# Patient Record
Sex: FEMALE | Race: WHITE | NOT HISPANIC OR LATINO
[De-identification: names, ages, dates, MRNs, and addresses within clinical notes are randomized per-mention and may not be internally consistent; named-entity substitution may affect disease eponyms.]

---

## 2020-01-02 ENCOUNTER — TRANSCRIPTION ENCOUNTER (OUTPATIENT)
Age: 35
End: 2020-01-02

## 2020-10-02 ENCOUNTER — TRANSCRIPTION ENCOUNTER (OUTPATIENT)
Age: 35
End: 2020-10-02

## 2020-10-08 ENCOUNTER — TRANSCRIPTION ENCOUNTER (OUTPATIENT)
Age: 35
End: 2020-10-08

## 2021-01-27 ENCOUNTER — TRANSCRIPTION ENCOUNTER (OUTPATIENT)
Age: 36
End: 2021-01-27

## 2023-02-13 PROBLEM — Z00.00 ENCOUNTER FOR PREVENTIVE HEALTH EXAMINATION: Status: ACTIVE | Noted: 2023-02-13

## 2023-02-14 ENCOUNTER — APPOINTMENT (OUTPATIENT)
Dept: GASTROENTEROLOGY | Facility: CLINIC | Age: 38
End: 2023-02-14
Payer: COMMERCIAL

## 2023-02-14 DIAGNOSIS — R10.9 UNSPECIFIED ABDOMINAL PAIN: ICD-10-CM

## 2023-02-14 PROCEDURE — 99214 OFFICE O/P EST MOD 30 MIN: CPT | Mod: 95

## 2023-02-14 NOTE — REASON FOR VISIT
[Initial Evaluation] : an initial evaluation [Home] : at home, [unfilled] , at the time of the visit. [Medical Office: ([facility])___] : at the medical office located in  [Patient] : the patient [Self] : self

## 2023-02-14 NOTE — HISTORY OF PRESENT ILLNESS
[FreeTextEntry1] : 37F here for cesar richards\par in high school eats something in AM and DIARRHEA (anxiety?)\par post college CONSTIPATED for days ... then urgency\par didn’t get her period for 8 years MARATHONS AND DISORDERED EATING/orthorexia- saw naturopath and helped her get her period back\par 2017 moved to [facility]\par BLOATED CONSTIPATED DISTENDED unbearable\par 2019 started working with parsley BLOATED AND CONSTIPATED\par 2020 SIBO test --> antibiotics rifxamin/neomycin + herbals (berberine...) and low fodmap 4wks and atrantil and motilpro..  felt 100% /much better that cleared up her skin\par 2022 BLOATED and constipated again so took rifaximin/neomycin felt good but not as great\par having RIGHT SIDE ACNE on spironolactone and topicals \par saw dr minaya did EGD CSCOPE normal -- just a little of ACID  and \par \par \par

## 2023-02-14 NOTE — ASSESSMENT
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

## 2024-04-20 ENCOUNTER — EMERGENCY (EMERGENCY)
Facility: HOSPITAL | Age: 39
LOS: 1 days | Discharge: HOME | End: 2024-04-20
Attending: EMERGENCY MEDICINE | Admitting: EMERGENCY MEDICINE
Payer: COMMERCIAL

## 2024-04-20 VITALS
RESPIRATION RATE: 16 BRPM | HEART RATE: 72 BPM | WEIGHT: 111.99 LBS | HEIGHT: 61 IN | SYSTOLIC BLOOD PRESSURE: 108 MMHG | DIASTOLIC BLOOD PRESSURE: 70 MMHG | TEMPERATURE: 98 F | OXYGEN SATURATION: 100 %

## 2024-04-20 PROCEDURE — 99284 EMERGENCY DEPT VISIT MOD MDM: CPT

## 2024-04-20 PROCEDURE — 70450 CT HEAD/BRAIN W/O DYE: CPT | Mod: 26,MC

## 2024-04-20 RX ORDER — ACETAMINOPHEN 500 MG
975 TABLET ORAL ONCE
Refills: 0 | Status: DISCONTINUED | OUTPATIENT
Start: 2024-04-20 | End: 2024-04-20

## 2024-04-20 NOTE — ED PROVIDER NOTE - PATIENT PORTAL LINK FT
You can access the FollowMyHealth Patient Portal offered by [facility] by registering at the following website: [email or web address]. By joining [facility]’s FollowMyHealth portal, you will also be able to view your health information using other applications (apps) compatible with our system.

## 2024-04-20 NOTE — ED PROVIDER NOTE - CARE PROVIDER_API CALL
Bradford Ball [facility]  Otolaryngology  [address], [address] [place] [address]  Phone: [phone]  Fax: [phone]  Follow Up Time:     Jason Tavera  Neurology  [address] [place] [address], [address]  [place] [address]  Phone: [phone]  Fax: [phone]  Follow Up Time:

## 2024-04-20 NOTE — ED PROVIDER NOTE - OBJECTIVE STATEMENT
Pt is a 38 yr old female c/o developing some neck stiffness on Wed or Thurs and developed an occipital HA since yesterday.  Over the last few wks also with some nausea after eats.  She gets HAs a lot -- usually around her menstrual cycle.  She says she is in the window now for her menstrual cycle.  No fever.  No head trauma.  Excedrin Migraine medication helped this morning.  Her HA pain is currently a 3 or 4 on a 1 to 10 scale.    PMH: Chronic headaches  Meds: Excedrin  Allergies: NKDA  Social: No tobacco, occasional ETOH, no drugs  PCP:  [facility]

## 2024-04-20 NOTE — ED PROVIDER NOTE - NSFOLLOWUPINSTRUCTIONS_ED_ALL_ED_FT
Thank you for letting us take care of you today.  Return to the Emergency Department if your symptoms worsen, or if any problems.    Take Tylenol and Motrin for your headache/neck pain.    Please follow up with the ENT (Ear-Nose-Throat) doctor (Dr. Bradford Ball).  Call [phone] to make an appointment.  If you have trouble making an ENT appointment please call our Referrals Manager (Livier) at [phone].  It is important that you do this because your CAT Scan results need further investigation.    For the headaches, please also call Dr. Tavera's office at the phone number listed below.    General Headache  A headache is pain or discomfort felt around the head or neck area. There are many causes and types of headaches. A few common types include:  Tension headaches.  Migraine headaches.  Cluster headaches.  Chronic daily headaches.  Sometimes, the specific cause of a headache may not be found.    Follow these instructions at home:  Watch your condition for any changes. Let your health care provider know about them. Take these steps to help with your condition:    Managing pain    A bag of ice on a towel on the skin.   A heating pad for use on the painful area.   Take over-the-counter and prescription medicines only as told by your health care provider. Treatment may include medicines for pain that are taken by mouth or applied to the skin.  Lie down in a dark, quiet room when you have a headache.  Keep lights dim if bright lights bother you or make your headaches worse.  If directed, put ice on your head and neck area:  Put ice in a plastic bag.  Place a towel between your skin and the bag.  Leave the ice on for 20 minutes, 2–3 times per day.  Remove the ice if your skin turns bright red. This is very important. If you cannot feel pain, heat, or cold, you have a greater risk of damage to the area.  If directed, apply heat to the affected area. Use the heat source that your health care provider recommends, such as a moist heat pack or a heating pad.  Place a towel between your skin and the heat source.  Leave the heat on for 20–30 minutes.  Remove the heat if your skin turns bright red. This is especially important if you are unable to feel pain, heat, or cold. You have a greater risk of getting burned.  Eating and drinking    Eat meals on a regular schedule.  If you drink alcohol:  Limit how much you have to:  0–1 drink a day for women who are not pregnant.  0–2 drinks a day for men.  Know how much alcohol is in a drink. In the [place] one drink equals one 12 oz bottle of beer (355 mL), one 5 oz glass of wine (148 mL), or one 1½ oz glass of hard liquor (44 mL).  Stop drinking caffeine, or decrease the amount of caffeine you drink.  Drink enough fluid to keep your urine pale yellow.  General instructions    A person writing in a journal.   Keep a headache journal to help find out what may trigger your headaches. For example, write down:  What you eat and drink.  How much sleep you get.  Any change to your diet or medicines.  Try massage or other relaxation techniques.  Limit stress.  Sit up straight, and do not tense your muscles.  Do not use any products that contain nicotine or tobacco. These products include cigarettes, chewing tobacco, and vaping devices, such as e-cigarettes. If you need help quitting, ask your health care provider.  Exercise regularly as told by your health care provider.  Sleep on a regular schedule. Get 7–9 hours of sleep each night, or the amount recommended by your health care provider.  Keep all follow-up visits. This is important.  Contact a health care provider if:  Medicine does not help your symptoms.  You have a headache that is different from your usual headache.  You have nausea or you vomit.  You have a fever.  Get help right away if:  Your headache:  Becomes severe quickly.  Gets worse after moderate to intense physical activity.  You have any of these symptoms:  Repeated vomiting.  Pain or stiffness in your neck.  Changes to your vision.  Pain in an eye or ear.  Problems with speech.  Muscular weakness or loss of muscle control.  Loss of balance or coordination.  You feel faint or pass out.  You have confusion.  You have a seizure.  These symptoms may represent a serious problem that is an emergency. Do not wait to see if the symptoms will go away. Get medical help right away. Call your local emergency services (911 in the [place] Do not drive yourself to the hospital.    Summary  A headache is pain or discomfort felt around the head or neck area.  There are many causes and types of headaches. In some cases, the cause may not be found.  Keep a headache journal to help find out what may trigger your headaches. Watch your condition for any changes. Let your health care provider know about them.  Contact a health care provider if you have a headache that is different from the usual headache, or if your symptoms are not helped by medicine.  Get help right away if your headache becomes severe, you vomit, you have a loss of vision, you lose your balance, or you have a seizure.  This information is not intended to replace advice given to you by your health care provider. Make sure you discuss any questions you have with your health care provider.    Document Revised: 05/18/2022 Document Reviewed: 05/18/2022  Elsevier Patient Education © 2024 [employer]

## 2024-04-20 NOTE — ED PROVIDER NOTE - PHYSICAL EXAMINATION
General: Patient is A&O x 3 in NAD; healthy appearing  Head: NC/AT  Eyes: PERRL, EOMI, no lid lag, no proptosis  Ears: Normal  Nose: Normal  Neck: Normal ROM, nontender  Lungs: Normal respiratory effort  Heart: Normal rate, no peripheral edema  Neuro: Gait normal, nonfocal, motor/sensory intact  Skin: No rash, lesions or ulcers  Psych: Normal affect and behavior, intact judgement and insight

## 2024-04-20 NOTE — ED PROVIDER NOTE - CLINICAL SUMMARY MEDICAL DECISION MAKING FREE TEXT BOX
Pt is a 38 yr old female c/o developing some neck stiffness on Wed or Thurs and developed an occipital HA since yesterday.  Over the last few wks also with some nausea after eats.  She gets HAs a lot -- usually around her menstrual cycle.  She says she is in the window now for her menstrual cycle.  No fever.  No head trauma.  Excedrin Migraine medication helped this morning.  Her HA pain is currently a 3 or 4 on a 1 to 10 scale.  Physical exam normal  CT head ordered  Tylenol given

## 2024-04-24 DIAGNOSIS — M54.2 CERVICALGIA: ICD-10-CM

## 2024-04-24 DIAGNOSIS — R51.9 HEADACHE, UNSPECIFIED: ICD-10-CM
